# Patient Record
Sex: MALE | Race: WHITE | NOT HISPANIC OR LATINO
[De-identification: names, ages, dates, MRNs, and addresses within clinical notes are randomized per-mention and may not be internally consistent; named-entity substitution may affect disease eponyms.]

---

## 2018-12-29 ENCOUNTER — RECORD ABSTRACTING (OUTPATIENT)
Age: 58
End: 2018-12-29

## 2018-12-29 DIAGNOSIS — R94.6 ABNORMAL RESULTS OF THYROID FUNCTION STUDIES: ICD-10-CM

## 2018-12-29 DIAGNOSIS — E78.5 HYPERLIPIDEMIA, UNSPECIFIED: ICD-10-CM

## 2018-12-29 DIAGNOSIS — Z83.49 FAMILY HISTORY OF OTHER ENDOCRINE, NUTRITIONAL AND METABOLIC DISEASES: ICD-10-CM

## 2018-12-29 DIAGNOSIS — N40.0 BENIGN PROSTATIC HYPERPLASIA WITHOUT LOWER URINARY TRACT SYMPMS: ICD-10-CM

## 2018-12-29 DIAGNOSIS — Z86.39 PERSONAL HISTORY OF OTHER ENDOCRINE, NUTRITIONAL AND METABOLIC DISEASE: ICD-10-CM

## 2018-12-29 DIAGNOSIS — Z78.9 OTHER SPECIFIED HEALTH STATUS: ICD-10-CM

## 2019-02-19 ENCOUNTER — LABORATORY RESULT (OUTPATIENT)
Age: 59
End: 2019-02-19

## 2019-02-22 ENCOUNTER — APPOINTMENT (OUTPATIENT)
Dept: ENDOCRINOLOGY | Facility: CLINIC | Age: 59
End: 2019-02-22
Payer: COMMERCIAL

## 2019-02-22 VITALS
HEIGHT: 68 IN | WEIGHT: 159 LBS | DIASTOLIC BLOOD PRESSURE: 80 MMHG | TEMPERATURE: 72 F | BODY MASS INDEX: 24.1 KG/M2 | SYSTOLIC BLOOD PRESSURE: 110 MMHG

## 2019-02-22 DIAGNOSIS — D64.9 ANEMIA, UNSPECIFIED: ICD-10-CM

## 2019-02-22 PROCEDURE — 99213 OFFICE O/P EST LOW 20 MIN: CPT

## 2019-04-20 NOTE — HISTORY OF PRESENT ILLNESS
[FreeTextEntry1] : February 22, 2019\par \par  PCP: - Dr. Dave Evans\par             ENT: Dr. Arrington - 2011 - cautarized L VC and lasered R\par             GI: Dr. Shen - colonoscopy 2013 - told to return in 10 year\par            .\par            CC: Hypothyroid (Dx - June 2012 - TSH 6.6) TPO ab neg\par             -2012: Thyroid sonogram [facility] - negative\par             (Hct 41)\par .\par Last visit dose increased: levothyroxine 25 mcg daily, but TWO on Sat AND Sun.  \par Last year, ripped L hamstring running.\par Back playing volleyball and dancing.    Also singing - had a few shows.  \par TSH slightly elevated.  \par  \par Plan:  Levothyroxine 25 mcg x 10 tablets a week.\par Updated labs and ROV in 8 months.  \par \par \par \par Previous notes from [employer] appended below.\par \par  January 15, 2018\par            .\par            PCP: - Dr. Viral Leyva phone p [phone] - last 6/2016\par             Fax: [phone]\par             ENT: Dr. Arrington - 2011 - cautarized L VC and lasered R\par             GI: Dr. Shen - colonoscopy 2013 - told to return in 10 year\par            .\par            CC: Hypothyroid (Dx - June 2012 - TSH 6.6) TPO ab neg\par             -2012: Thyroid sonogram [facility] - negative\par             (Hct 41)\par            .\par            Remains on levothyroxine 25 mcg x 8 a week.\par            Recent TSH 5.96\par            .\par            Plan: Increase to 9 tabs a week and\par            ROV 6 months. \par            .\par            ==\par            .\par            Sept 22, 2017\par            .\par            PCP: - Dr. Viral Leyva phone p [phone] - last 6/2016\par             Fax: [phone]\par             ENT: Dr. Arrington - 2011 - cautarized L VC and lasered R\par             GI: Dr. Shen - colonoscopy 2013 - told to return in 10 year\par            .\par            CC: Hypothyroid (Dx - June 2012 - TSH 6.6) TPO ab neg\par             -2012: Thyroid sonogram [facility] - negative\par             (Hct 41)\par            .\par            Taking levothyroxine 25 mcg x 8 pills a week.\par            TSH in good range.\par            Random BS 57 mg/dl\par            .\par            Impression: He feels well.\par            (Had been on as much as 50 mcg LT4 daily, but he preferred to lower dose.)\par            He is concerned about triglycerides...was not fasting. \par            .\par            Plan: Same Rx and ROV 6 months. \par            Fasting labs before that visit.\par            He will also follow up to Dr. Leyva.\par            Thank you. -jh\par            .\par            .\par            ==\par            .\par            Feb 20, 2017\par            .\par            PCP: - Dr. Viral Leyva phone p [phone] - last 6/2016\par             Fax: [phone]\par             ENT: Dr. Arrington - 2011 - cautarized L VC and lasered R\par             GI: Dr. Shen - colonoscopy 2013 - told to return in 10 year\par            .\par            CC: Hypothyroid (Dx - June 2012 - TSH 6.6) TPO ab neg\par             -2012: Thyroid sonogram [facility] - negative\par             (Hct 41)\par            .\par            Remains on generic levothyroxine 25 mcg daily.\par            Dec 30, 2016 labs at [place] include:\par            TSH 5.48\par            fe 114/TIBC 308 Hct 41.1\par            MCV 86\par            .\par            Impression: Hypothyroidism well controlled. \par            .\par            Plan: Continue same Rx and may be ready to raise dose by next visit. \par            .\par            =\par            .\par            November 11, 2015\par            .\par            PCP: - Dr. Viral Leyva phone p [phone] \par             Fax: [phone]\par             ENT: Dr. Arrington - 2011 - cautarized L VC and lasered R\par             GI: Dr. Shen - colonoscopy 2013 - told to return in 10 year\par            .\par            CC: Hypothyroid (Dx - June 2012 - TSH 6.6)\par             -2012: Thyroid sonogram [facility] - negative\par            .\par            Note from April appended below\par            .\par            October 24 labs ([place]) include\par            TSH 3.0 on levothyroxine \par            LDL 89\par            HDL 44\par            BS 69 mg/dl\par            Hct 41.2\par            .\par            Impression: Previous ultrasound thyrid unremarkable at [facility] in 2012. Mild hypothyroidism remains well controlled on generic levothyroxine Appears to have a slight anemia\par            .\par            Plan: Updated CBC today, f/u to  and Dr. Shen. \par            ROV 6-8 months. Call a few days after labs. \par            .\par            .\par            ==\par            .\par            April 2, 2015\par            .\par            PCP: - Dr. Viral Leyva phone p [phone] \par             Fax: [phone]\par             ENT: Dr. Arrington - 2011 - cautarized L VC and lasered R\par            .\par            CC: Hypothyroid (Dx - June 2012 - TSH 6.6)\par            .\par            Teaches. \par            During summer, paints houses.\par            Likes singing and dancing. 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 Jimbo Kramer - Dr. Viral Leyva phone p [phone] \par             Fax: [phone]\par            CC: Hypothyroid (Dx - June 2012 - TSH 6.6)\par            .\par            Most recent lab tests from June 19, 2014 show TSH 3.61. \par            Currently taking PREET Synthroid 25 mcg daily.\par            He is feeling well.\par            Has been painting over the summer and has\par            singing engagement scheduled at [employer].\par            .\par            Impression: Very early hypothyroidism.\par            Plan: Switch to generic levothyroxine.\par            Repeat labs December and ROV January.\par            .\par            .\par            =====\par            Jan 20, 2014\par            PCP: Dr. Jimbo Kramer\par            CC: Hypothyroid\par            .\par            Previous note appended below.\par            Mother is on thyroid hormone.\par            Oriignally started on PREET Synthroid 25 and now takes 50 mcg, but he would like to try going back to the 25 mcg dose.\par            He started on 50 mcg in October and by Dec, Dr. Krmaer found TSH down to normal at 3.06. \par            .\par            Impression: As he prefers the 25 mcg dlose of PREET Synthroid, advised him he could cut pills in half or take every other day. He had thyroid antibodies by Dr. Kramer, but we \par            Re-requested antibody levels.\par            .\par            ========\par            July 30, 2013\par            PCP: Dr. Jimbo Kramer\par            CC: Hypothyroid knows Dr. Noe Barrera\par            .\par            Dr. 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 Also has lots of sinus problems. \par            Non-smoker.\par            .\par            Diagnosed with underactive thyroid June 2012. October started on 25 mcg daily. \par            .\par            TSH went back up when he stopped the medication.\par \par

## 2019-08-28 ENCOUNTER — RX CHANGE (OUTPATIENT)
Age: 59
End: 2019-08-28

## 2019-09-06 ENCOUNTER — RX CHANGE (OUTPATIENT)
Age: 59
End: 2019-09-06

## 2019-10-26 ENCOUNTER — LABORATORY RESULT (OUTPATIENT)
Age: 59
End: 2019-10-26

## 2019-11-11 ENCOUNTER — APPOINTMENT (OUTPATIENT)
Dept: ENDOCRINOLOGY | Facility: CLINIC | Age: 59
End: 2019-11-11
Payer: COMMERCIAL

## 2019-11-11 VITALS
DIASTOLIC BLOOD PRESSURE: 70 MMHG | SYSTOLIC BLOOD PRESSURE: 110 MMHG | BODY MASS INDEX: 24.25 KG/M2 | WEIGHT: 160 LBS | HEIGHT: 68 IN | HEART RATE: 73 BPM

## 2019-11-11 PROCEDURE — 99214 OFFICE O/P EST MOD 30 MIN: CPT

## 2019-11-11 NOTE — HISTORY OF PRESENT ILLNESS
[FreeTextEntry1] : Nov 11, 2019\par \par  PCP: - Dr. Dave Evans\par             ENT: Dr. Arrington - 2011 - cauterized L VC and lasered R\par             GI: Dr. Shen - colonoscopy 2013 - told to return in 10 year\par              ENT  Dev MARIA DEL ROSARIO Tipton MD\par            .\par            CC: Hypothyroid (Dx - June 2012 - TSH 6.6) TPO ab neg\par             -2012: Thyroid sonogram [facility] - negative\par             (Hct 41)\par \par For sore throat and sinus problem treated with Z [place], [place] [facility] [place] with\par benefit.  \par \par Last saw Dr. Arrington about 2016.  Also sees Portillo Tipton MD in [place].\par \par Impression:  room to increase the levothryoxine by one tablet.\par Follow up to ENT.\par [place] [facility] [place] [facility] [place] \par Impression:  Doing well.  Hypothyroidism very slowly progressing.  Ultrasound of thyroid at [facility] in 10/2012\par showed no focal abnormality.  \par .\par Taking levothyroxine 25 mcg daily but two (2) tablets Fri, Sat, Sun.\par TSH is 4.37 (0.27-4.2) so will increase to two (2) tablets on Thurs, Fri, Sat and Sun or\par 11 tablets a week.  Repeat TFTs ~March.     \par \par \par \par February 22, 2019\par \par  PCP: - Dr. Dave Evans\par             ENT: Dr. Arrington - 2011 - cautarized L VC and lasered R\par             GI: Dr. Shen - colonoscopy 2013 - told to return in 10 year\par            .\par            CC: Hypothyroid (Dx - June 2012 - TSH 6.6) TPO ab neg\par             -2012: Thyroid sonogram [facility] - negative\par             (Hct 41)\par .\par Last visit dose increased: levothyroxine 25 mcg daily, but TWO on Sat AND Sun.  \par Last year, ripped L hamstring running.\par Back playing volleyball and dancing.    Also singing - had a few shows.  \par TSH slightly elevated.  \par  \par Plan:  Levothyroxine 25 mcg x 10 tablets a week.\par Updated labs and ROV in 8 months.  \par \par \par \par Previous notes from [employer] appended below.\par \par  January 15, 2018\par            .\par            PCP: - Dr. Viral Leyva phone p [phone] - last 6/2016\par             Fax: [phone]\par             ENT: Dr. Arrington - 2011 - cautarized L VC and lasered R\par             GI: Dr. Shen - colonoscopy 2013 - told to return in 10 year\par            .\par            CC: Hypothyroid (Dx - June 2012 - TSH 6.6) TPO ab neg\par             -2012: Thyroid sonogram [facility] - negative\par             (Hct 41)\par            .\par            Remains on levothyroxine 25 mcg x 8 a week.\par            Recent TSH 5.96\par            .\par            Plan: Increase to 9 tabs a week and\par            ROV 6 months. \par            .\par            ==\par            .\par            Sept 22, 2017\par            .\par            PCP: - Dr. Viral Leyva phone p [phone] - last 6/2016\par             Fax: [phone]\par             ENT: Dr. Arrington - 2011 - cautarized L VC and lasered R\par             GI: Dr. Shen - colonoscopy 2013 - told to return in 10 year\par            .\par            CC: Hypothyroid (Dx - June 2012 - TSH 6.6) TPO ab neg\par             -2012: Thyroid sonogram [facility] - negative\par             (Hct 41)\par            .\par            Taking levothyroxine 25 mcg x 8 pills a week.\par            TSH in good range.\par            Random BS 57 mg/dl\par            .\par            Impression: He feels well.\par            (Had been on as much as 50 mcg LT4 daily, but he preferred to lower dose.)\par            He is concerned about triglycerides...was not fasting. \par            .\par            Plan: Same Rx and ROV 6 months. \par            Fasting labs before that visit.\par            He will also follow up to Dr. Leyva.\par            Thank you. -\par            .\par            .\par            ==\par            .\par            Feb 20, 2017\par            .\par            PCP: - Dr. Viral Leyva phone p [phone] - last 6/2016\par             Fax: [phone]\par             ENT: Dr. Arrington - Mar - cautarized L VC and lasered R\par             GI: Dr. Shen - colonoscopy 2013 - told to return in 10 year\par            .\par            CC: Hypothyroid (Dx - June 2012 - TSH 6.6) TPO ab neg\par             -2012: Thyroid sonogram [facility] - negative\par             (Hct 41)\par            .\par            Remains on generic levothyroxine 25 mcg daily.\par            Dec 30, 2016 labs at [place] include:\par            TSH 5.48\par            fe 114/TIBC 308 Hct 41.1\par            MCV 86\par            .\par            Impression: Hypothyroidism well controlled. \par            .\par            Plan: Continue same Rx and may be ready to raise dose by next visit. \par            .\par            =\par            .\par            November 11, 2015\par            .\par            PCP: - Dr. Viral Leyva phone p [phone] \par             Fax: [phone]\par             ENT: Dr. Arrington - 2011 - cautarized L VC and lasered R\par             GI: Dr. Shen - colonoscopy 2013 - told to return in 10 year\par            .\par            CC: Hypothyroid (Dx - June 2012 - TSH 6.6)\par             -2012: Thyroid sonogram [facility] - negative\par            .\par            Note from April appended below\par            .\par            October 24 labs ([place]) include\par            TSH 3.0 on levothyroxine \par            LDL 89\par            HDL 44\par            BS 69 mg/dl\par            Hct 41.2\par            .\par            Impression: Previous ultrasound thyrid unremarkable at [facility] in 2012. Mild hypothyroidism remains well controlled on generic levothyroxine Appears to have a slight anemia\par            .\par            Plan: Updated CBC today, f/u to  and Dr. Shen. \par            ROV 6-8 months. Call a few days after labs. \par            .\par            .\par            ==\par            .\par            April 2, 2015\par            .\par            PCP: - Dr. Viral Leyva phone p [phone] \par             Fax: [phone]\par             ENT: Dr. Arrington - 2011 - cautarized L VC and lasered R\par            .\par            CC: Hypothyroid (Dx - June 2012 - TSH 6.6)\par            .\par            Teaches. \par            During summer, paints houses.\par            Likes singing and dancing. 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 Jimbo Kramer - Dr. Viral Leyva phone p [phone] \par             Fax: [phone]\par            CC: Hypothyroid (Dx - June 2012 - TSH 6.6)\par            .\par            Most recent lab tests from June 19, 2014 show TSH 3.61. \par            Currently taking PREET Synthroid 25 mcg daily.\par            He is feeling well.\par            Has been painting over the summer and has\par            singing engagement scheduled at [employer].\par            .\par            Impression: Very early hypothyroidism.\par            Plan: Switch to generic levothyroxine.\par            Repeat labs December and ROV January.\par            .\par            .\par            =====\par            Jan 20, 2014\par            PCP: Dr. Jimbo Kramer\par            CC: Hypothyroid\par            .\par            Previous note appended below.\par            Mother is on thyroid hormone.\par            Oriignally started on PREET Synthroid 25 and now takes 50 mcg, but he would like to try going back to the 25 mcg dose.\par            He started on 50 mcg in October and by Dec, Dr. Kramer found TSH down to normal at 3.06. \par            .\par            Impression: As he prefers the 25 mcg dlose of PREET Synthroid, advised him he could cut pills in half or take every other day. He had thyroid antibodies by Dr. Kramer, but we \par            Re-requested antibody levels.\par            .\par            ========\par            July 30, 2013\par            PCP: Dr. Jimbo Kramer\par            CC: Hypothyroid knows Dr. Noe Barrera\par            .\par            Dr. 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 Also has lots of sinus problems. \par            Non-smoker.\par            .\par            Diagnosed with underactive thyroid June 2012. October started on 25 mcg daily. \par            .\par            TSH went back up when he stopped the medication.\par \par

## 2019-11-11 NOTE — PHYSICAL EXAM
[Alert] : alert [No Acute Distress] : no acute distress [Well Nourished] : well nourished [Well Developed] : well developed [Healthy Appearance] : healthy appearance [Normal Voice/Communication] : normal voice communication [No Proptosis] : no proptosis [No Lid Lag] : no lid lag [Normal Outer Ear/Nose] : the ears and nose were normal in appearance [Thyroid Not Enlarged] : the thyroid was not enlarged [No Thyroid Nodules] : there were no palpable thyroid nodules [No Respiratory Distress] : no respiratory distress [Normal Rate and Effort] : normal respiratory rhythm and effort [No Accessory Muscle Use] : no accessory muscle use [Normal Rate] : heart rate was normal  [Normal S1, S2] : normal S1 and S2 [Regular Rhythm] : with a regular rhythm [No Edema] : there was no peripheral edema [No Stigmata of Cushings Syndrome] : no stigmata of cushings syndrome [No Involuntary Movements] : no involuntary movements were seen [No Tremors] : no tremors [Normal Insight/Judgement] : insight and judgment were intact [Oriented x3] : oriented to person, place, and time [Normal Affect] : the affect was normal [Normal Mood] : the mood was normal

## 2020-03-19 ENCOUNTER — LABORATORY RESULT (OUTPATIENT)
Age: 60
End: 2020-03-19

## 2020-03-23 ENCOUNTER — APPOINTMENT (OUTPATIENT)
Dept: ENDOCRINOLOGY | Facility: CLINIC | Age: 60
End: 2020-03-23
Payer: COMMERCIAL

## 2020-03-23 PROCEDURE — 99443: CPT

## 2020-03-23 NOTE — HISTORY OF PRESENT ILLNESS
[Home] : at home, [unfilled] , at the time of the visit. [Clinic: ([facility])___] : at the clinic in [unfilled] [Patient & Provider:  (Enter provider's Role) ____] : The patient, [unfilled] and [unfilled] ~ecp~  participated in the telehealth encounter [FreeTextEntry2] : Patient [FreeTextEntry3] : Patient [FreeTextEntry1] : Mar 23, 2020  CC: Hypothyroid  PCP: Dr. Dave Evans - expects to see in June 2020  This is a Tele-Phonic Billable Encouter follow up encounter initiated by the patient, Mr. Gibson Westfall at 8:55 am.  Rationale: Corona virus. Not seen by a [facility] provider within Endocrinology in the past 7 days. Does not have b/u appointment within 7 days after this encounter.  This is a 21+ minute Tele-Phonic encounter with an established patient which was initiated by the patient during a time scheduled for a visit and the patient is aware that this may be a billable encounter. The patient has not seen a provider of my specialty (Endocrinology) within out group in the past 7 days and this encounter is not anticipated to result in a scheduled in-person visit within he next 7 days. The reason for this Tele-Phonic encounter is listed below under "CC:" Verbal consent was discussed and obtained from the patient for this visit: "You have chosen to receive care through the use of tele-media or telephonee. This enables health care providers at different locations to provide safe, effective, and convenient care through the use of technology. Please note this is a billable encounter. As with any health care service, there are risks associated with the use of tele-media or telephone, including equipment failure, poor image and/or resolutaion, and  issues. You understant that I cannot physically examine you and that you may need to come to the office to complete the assessment.  Patient agreed verbally to understanding the risks, benefits, alternatives of Tele-Media and telephone as explained. All questions regarding tele-media and telephone encounters were answered.   For the hypothyroidism, he is taking: Taking levothyroxine 25 mcg daily but two (2) tablets on Thurs, Fri, Sat and Sun or 11 tablets a week. Repeat TFTs   On March 19, 2020  TSH 5.67  Plan: Increase levothyroxine to 50 mcg daily and repeat TFTs in about six months. 25 minute encounter, CTP 52621   James Hellerman, MD Endocrinology

## 2020-03-23 NOTE — ASSESSMENT
[FreeTextEntry1] : Hypothyroid - will require increase in dose of levothyroxine to 50 mcg daiily.  \par \par 25 minute phone visit

## 2021-03-18 ENCOUNTER — TRANSCRIPTION ENCOUNTER (OUTPATIENT)
Age: 61
End: 2021-03-18

## 2021-03-19 ENCOUNTER — APPOINTMENT (OUTPATIENT)
Dept: ENDOCRINOLOGY | Facility: CLINIC | Age: 61
End: 2021-03-19
Payer: COMMERCIAL

## 2021-03-19 LAB
ALBUMIN SERPL ELPH-MCNC: 4.4 G/DL
ALP BLD-CCNC: 77 U/L
ALT SERPL-CCNC: 26 U/L
ANION GAP SERPL CALC-SCNC: 16 MMOL/L
AST SERPL-CCNC: 24 U/L
BASOPHILS # BLD AUTO: 0.03 K/UL
BASOPHILS NFR BLD AUTO: 0.5 %
BILIRUB DIRECT SERPL-MCNC: 0.1 MG/DL
BILIRUB INDIRECT SERPL-MCNC: 0.2 MG/DL
BILIRUB SERPL-MCNC: 0.3 MG/DL
BUN SERPL-MCNC: 19 MG/DL
CALCIUM SERPL-MCNC: 9.7 MG/DL
CHLORIDE SERPL-SCNC: 105 MMOL/L
CO2 SERPL-SCNC: 20 MMOL/L
CREAT SERPL-MCNC: 1.03 MG/DL
EOSINOPHIL # BLD AUTO: 0.15 K/UL
EOSINOPHIL NFR BLD AUTO: 2.5 %
GLUCOSE SERPL-MCNC: 87 MG/DL
HCT VFR BLD CALC: 43.9 %
HGB BLD-MCNC: 14.4 G/DL
IMM GRANULOCYTES NFR BLD AUTO: 0.3 %
LYMPHOCYTES # BLD AUTO: 1.51 K/UL
LYMPHOCYTES NFR BLD AUTO: 25.2 %
MAN DIFF?: NORMAL
MCHC RBC-ENTMCNC: 29.6 PG
MCHC RBC-ENTMCNC: 32.8 GM/DL
MCV RBC AUTO: 90.1 FL
MONOCYTES # BLD AUTO: 0.64 K/UL
MONOCYTES NFR BLD AUTO: 10.7 %
NEUTROPHILS # BLD AUTO: 3.64 K/UL
NEUTROPHILS NFR BLD AUTO: 60.8 %
PLATELET # BLD AUTO: 197 K/UL
POTASSIUM SERPL-SCNC: 4.7 MMOL/L
PROT SERPL-MCNC: 6.7 G/DL
PSA SERPL-MCNC: 0.88 NG/ML
RBC # BLD: 4.87 M/UL
RBC # FLD: 13.1 %
SODIUM SERPL-SCNC: 141 MMOL/L
T3 SERPL-MCNC: 119 NG/DL
T4 SERPL-MCNC: 8 UG/DL
TSH SERPL-ACNC: 3.9 UIU/ML
WBC # FLD AUTO: 5.99 K/UL

## 2021-03-19 PROCEDURE — 99443: CPT

## 2021-03-26 NOTE — HISTORY OF PRESENT ILLNESS
[Home] : at home, [unfilled] , at the time of the visit. [Medical Office: ([facility])___] : at the medical office located in  [Verbal consent obtained from patient] : the patient, [unfilled] [FreeTextEntry1] : Mar 19, 2021   Telephonic\par \par PCP:  Dr. Dave Evans\par          ENT:  Dr. Arrington,  Dr. Tipton\par          GI: Dr. Shen\par \par \par CC:  Hypothyroid\par               -[facility] 2012 - rassuring\par \par Recent lab tests at [facility] on\par 1/23/2021 included TSH 3.9\par Taking levothoxine 25 mcg tablets, currently 10 tablets a week.  \par \par Impression:  Well controlled hypothhroidism.\par \par Plan;  Same Rx.  \par \par

## 2021-06-01 ENCOUNTER — RESULT REVIEW (OUTPATIENT)
Age: 61
End: 2021-06-01

## 2022-02-24 ENCOUNTER — LABORATORY RESULT (OUTPATIENT)
Age: 62
End: 2022-02-24

## 2022-03-04 ENCOUNTER — APPOINTMENT (OUTPATIENT)
Dept: ENDOCRINOLOGY | Facility: CLINIC | Age: 62
End: 2022-03-04
Payer: COMMERCIAL

## 2022-03-04 VITALS
DIASTOLIC BLOOD PRESSURE: 80 MMHG | HEART RATE: 82 BPM | HEIGHT: 68 IN | WEIGHT: 158 LBS | SYSTOLIC BLOOD PRESSURE: 120 MMHG | BODY MASS INDEX: 23.95 KG/M2 | OXYGEN SATURATION: 98 %

## 2022-03-04 PROCEDURE — 99214 OFFICE O/P EST MOD 30 MIN: CPT

## 2022-03-04 RX ORDER — LEVOTHYROXINE SODIUM 0.05 MG/1
50 TABLET ORAL
Qty: 60 | Refills: 11 | Status: DISCONTINUED | COMMUNITY
Start: 1900-01-01 | End: 2022-03-04

## 2022-03-04 NOTE — HISTORY OF PRESENT ILLNESS
[FreeTextEntry1] : Mar 04, 2022    in person\par \par  PCP: - Dr. Dave Evans\par             ENT: Dr. Arrington - 2011 - cautarized L VC and lasered R\par             GI: Dr. Shen - colonoscopy 2013 - told to return in 10 year\par            .\par            CC: Hypothyroid (Dx - June 2012 - TSH 6.6) TPO ab neg\par             -2012: Thyroid sonogram [facility] - negative    Also 2013 \par             (Hct 41)\par \par Recent lab tests at [place] on\par 1/23/2021 included TSH 3.9\par Taking levothoxine 50 mcg every day \par \par Impression:  TSH is slighlty elevated.\par Plan:  Increase levothyroxine x 7 tabs a week to 7 1/2 tabs a week\par Will also take into account effect on his energy, mood. \par \par \par \par Mar 19, 2021   Telephonic\par \par PCP:  Dr. Dave Evans\par          ENT:  Dr. Arrington,  Dr. Tipton\par          GI: Dr. Shen\par \par \par CC:  Hypothyroid\par               -[facility] 2012 - rassuring\par \par Recent lab tests at [place] on\par 1/23/2021 included TSH 3.9\par Taking levothoxine 25 mcg tablets, currently 10 tablets a week.  \par \par Impression:  Well controlled hypothhroidism.\par \par Plan;  Same Rx.  \par \par

## 2023-01-23 ENCOUNTER — RESULT REVIEW (OUTPATIENT)
Age: 63
End: 2023-01-23

## 2023-02-15 ENCOUNTER — APPOINTMENT (OUTPATIENT)
Dept: ENDOCRINOLOGY | Facility: CLINIC | Age: 63
End: 2023-02-15
Payer: COMMERCIAL

## 2023-02-15 VITALS
OXYGEN SATURATION: 99 % | HEART RATE: 82 BPM | SYSTOLIC BLOOD PRESSURE: 118 MMHG | HEIGHT: 68 IN | DIASTOLIC BLOOD PRESSURE: 73 MMHG | BODY MASS INDEX: 24.25 KG/M2 | WEIGHT: 160 LBS

## 2023-02-15 DIAGNOSIS — E53.8 DEFICIENCY OF OTHER SPECIFIED B GROUP VITAMINS: ICD-10-CM

## 2023-02-15 DIAGNOSIS — Z00.00 ENCOUNTER FOR GENERAL ADULT MEDICAL EXAMINATION W/OUT ABNORMAL FINDINGS: ICD-10-CM

## 2023-02-15 PROCEDURE — 99214 OFFICE O/P EST MOD 30 MIN: CPT

## 2023-02-15 NOTE — HISTORY OF PRESENT ILLNESS
[FreeTextEntry1] : Feb 15, 2023    in person\par \par PCP: - Dr. Dave Evans\par             ENT: Dr. Arrington - 2011 - cautarized L VC and lasered R\par             GI: Dr. Shen - colonoscopy 2013 - > Dr. Sharma     12/9/2022  \par            .\par            CC: Hypothyroid (Dx - June 2012 - TSH 6.6) TPO ab neg\par             -2012: Thyroid [facility] - negative    Also 2013 \par             (Hct 41)\par \par 61 yo with hypothhyroidism Dx June 2012.  Taking levothyoxine 50 mcg x 7 1/2 tabs a week.    \par Recent TSH 5.66\par \par Also attended (February) [employer] dinner.  \par \par : :\par Constitutional:  Alert, well nourished, healthy appearance, no acute distress \par Eyes:  No proptosis, no stare\par Thyroid:\par Pulmonary:  No respiratory distress, no accessory muscle use; normal rate and effort\par Cardiac:  Normal rate\par Vascular: \par Endocrine:  No stigmata of Cushing’s Syndrome\par Musculoskeletal:  Normal gait, no involuntary movements\par Neurology:  No tremors\par Affect/Mood/Psych:  Oriented x 3; normal affect, normal insight/judgement, normal mood \par .\par \par Plan:  Increase the levothyroxien 50 mcg x 7 1/2 tabs a week to 8 tabs a week and repeat in six months.\par \par \par \par Mar 04, 2022    in person\par \par  PCP: - Dr. Dave Evans\par             ENT: Dr. Arrington - 2011 - cautarized L VC and lasered R\par             GI: Dr. Shen - colonoscopy 2013 - told to return in 10 year\par            .\par            CC: Hypothyroid (Dx - June 2012 - TSH 6.6) TPO ab neg\par             -2012: Thyroid [facility] - negative    Also 2013 \par             (Hct 41)\par \par Recent lab tests at [place] on\par 1/23/2021 included TSH 3.9\par Taking levothoxine 50 mcg every day \par \par Impression:  TSH is slighlty elevated.\par Plan:  Increase levothyroxine x 7 tabs a week to 7 1/2 tabs a week\par Will also take into account effect on his energy, mood. \par \par \par \par Mar 19, 2021   Telephonic\par \par PCP:  Dr. Dave Evans\par          ENT:  Dr. Arrington,  Dr. Tipton\par          GI: Dr. Shen\par \par \par CC:  Hypothyroid\par               -[facility] 2012 - [facility]uring\par \par Recent lab tests at [place] on\par 1/23/2021 included TSH 3.9\par Taking levothoxine 25 mcg tablets, currently 10 tablets a week.  \par \par Impression:  Well controlled hypothhroidism.\par \par Plan;  Same Rx.  \par \par

## 2023-08-27 ENCOUNTER — RESULT REVIEW (OUTPATIENT)
Age: 63
End: 2023-08-27

## 2023-12-06 ENCOUNTER — APPOINTMENT (OUTPATIENT)
Dept: ENDOCRINOLOGY | Facility: CLINIC | Age: 63
End: 2023-12-06
Payer: COMMERCIAL

## 2023-12-06 VITALS
HEIGHT: 68 IN | WEIGHT: 160.13 LBS | SYSTOLIC BLOOD PRESSURE: 116 MMHG | HEART RATE: 85 BPM | DIASTOLIC BLOOD PRESSURE: 80 MMHG | OXYGEN SATURATION: 97 % | BODY MASS INDEX: 24.27 KG/M2

## 2023-12-06 DIAGNOSIS — R53.83 OTHER FATIGUE: ICD-10-CM

## 2023-12-06 DIAGNOSIS — E03.9 HYPOTHYROIDISM, UNSPECIFIED: ICD-10-CM

## 2023-12-06 PROCEDURE — 36415 COLL VENOUS BLD VENIPUNCTURE: CPT

## 2023-12-06 PROCEDURE — 99214 OFFICE O/P EST MOD 30 MIN: CPT | Mod: 25

## 2023-12-06 RX ORDER — LEVOTHYROXINE SODIUM 0.05 MG/1
50 TABLET ORAL
Qty: 35 | Refills: 11 | Status: ACTIVE | COMMUNITY
Start: 2022-03-04 | End: 1900-01-01

## 2023-12-07 LAB
ALBUMIN SERPL ELPH-MCNC: 4.4 G/DL
ALP BLD-CCNC: 87 U/L
ALT SERPL-CCNC: 29 U/L
ANION GAP SERPL CALC-SCNC: 13 MMOL/L
AST SERPL-CCNC: 28 U/L
BILIRUB DIRECT SERPL-MCNC: 0.1 MG/DL
BILIRUB INDIRECT SERPL-MCNC: 0.2 MG/DL
BILIRUB SERPL-MCNC: 0.2 MG/DL
BUN SERPL-MCNC: 18 MG/DL
CALCIUM SERPL-MCNC: 9.6 MG/DL
CHLORIDE SERPL-SCNC: 104 MMOL/L
CO2 SERPL-SCNC: 25 MMOL/L
CORTIS SERPL-MCNC: 9.6 UG/DL
CREAT SERPL-MCNC: 0.9 MG/DL
EGFR: 97 ML/MIN/1.73M2
FERRITIN SERPL-MCNC: 174 NG/ML
GLUCOSE SERPL-MCNC: 97 MG/DL
HCT VFR BLD CALC: 42.6 %
HGB BLD-MCNC: 14.3 G/DL
IRON SATN MFR SERPL: 21 %
IRON SERPL-MCNC: 68 UG/DL
LH SERPL-ACNC: 4.7 IU/L
MCHC RBC-ENTMCNC: 30.2 PG
MCHC RBC-ENTMCNC: 33.6 GM/DL
MCV RBC AUTO: 89.9 FL
PLATELET # BLD AUTO: 186 K/UL
POTASSIUM SERPL-SCNC: 4.5 MMOL/L
PROLACTIN SERPL-MCNC: 8.6 NG/ML
PROT SERPL-MCNC: 6.8 G/DL
RBC # BLD: 4.74 M/UL
RBC # BLD: 4.74 M/UL
RBC # FLD: 13.2 %
RETICS # AUTO: 1.1 %
RETICS AGGREG/RBC NFR: 50.2 K/UL
SODIUM SERPL-SCNC: 142 MMOL/L
T4 SERPL-MCNC: 7.8 UG/DL
TESTOST SERPL-MCNC: 616 NG/DL
TIBC SERPL-MCNC: 324 UG/DL
TSH SERPL-ACNC: 4.77 UIU/ML
UIBC SERPL-MCNC: 255 UG/DL
VIT B12 SERPL-MCNC: 775 PG/ML
WBC # FLD AUTO: 5.89 K/UL

## 2024-01-23 ENCOUNTER — TRANSCRIPTION ENCOUNTER (OUTPATIENT)
Age: 64
End: 2024-01-23

## 2024-08-24 ENCOUNTER — RESULT REVIEW (OUTPATIENT)
Age: 64
End: 2024-08-24

## 2024-09-03 ENCOUNTER — APPOINTMENT (OUTPATIENT)
Dept: ENDOCRINOLOGY | Facility: CLINIC | Age: 64
End: 2024-09-03
Payer: COMMERCIAL

## 2024-09-03 VITALS
DIASTOLIC BLOOD PRESSURE: 68 MMHG | SYSTOLIC BLOOD PRESSURE: 116 MMHG | OXYGEN SATURATION: 98 % | WEIGHT: 155 LBS | BODY MASS INDEX: 23.49 KG/M2 | HEART RATE: 64 BPM | HEIGHT: 68 IN

## 2024-09-03 DIAGNOSIS — E03.9 HYPOTHYROIDISM, UNSPECIFIED: ICD-10-CM

## 2024-09-03 PROCEDURE — 99215 OFFICE O/P EST HI 40 MIN: CPT

## 2024-09-03 NOTE — HISTORY OF PRESENT ILLNESS
[FreeTextEntry1] : Sep 03, 2024       in person    - his 91 yo mother  after a fall and broken hip  PCP: - Dr. Dave Evans             ENT: Dr. Arrington -  - cautarized L VC and lasered R             GI: Dr. Shen - colonoscopy  - > Dr. Sharma     2022              .            CC: Hypothyroid (Dx - 2012 - TSH 6.6) TPO ab neg             -: Thyroid [facility] - negative    Also 2013              (Hct 41)  65 yo with hypothhyroidism Dx 2012.  Taking levothyoxine 50 mcg  tabs x 8 tabs a week  Lab tests from  included WBC 6.92 Hct 43.5 glucose 66 LDL  77 - on no Rx ! HDL 51  TSH  3.71 on LT4 50 mcg x 8 tabs a week.    PSA 1.08   : : Constitutional:  Alert, well nourished, healthy appearance, no acute distress  Eyes:  No proptosis, no stare Thyroid:  minimal tissue Pulmonary:  No respiratory distress, no accessory muscle use; normal rate and effort Cardiac:  Normal rate Vascular:  Endocrine:  No stigmata of Cushings Syndrome Musculoskeletal:  Normal gait, no involuntary movements Neurology:  No tremors Affect/Mood/Psych:  Oriented x 3; normal affect, normal insight/judgement, normal mood   Impression:  Hypothyroidism well controlled. Plan:  Same Rx with levothyroxine 50 mcg x 8 tabs a week and ROV in under a year. It is possible that he will move to be closer to his siter.     .   Dec 06, 2023    in person     PCP: - Dr. Dave Evans             ENT: Dr. Arrington -  - cautarized L VC and lasered R             GI: Dr. Shen - colonoscopy  - > Dr. Sharma     2022              .            CC: Hypothyroid (Dx - 2012 - TSH 6.6) TPO ab neg             -: Thyroid [facility] - negative    Also               (Hct 41)  64 yo with hypothhyroidism Dx 2012.  Taking levothyoxine 50 mcg tabs a week.     Recent TSH 5.66  Regularly attends  [employer] dinners.    : : Constitutional:  Alert, well nourished, healthy appearance, no acute distress  Eyes:  No proptosis, no stare Thyroid: Pulmonary:  No respiratory distress, no accessory muscle use; normal rate and effort Cardiac:  Normal rate Vascular:  Endocrine:  No stigmata of Cushings Syndrome Musculoskeletal:  Normal gait, no involuntary movements Neurology:  No tremors Affect/Mood/Psych:  Oriented x 3; normal affect, normal insight/judgement, normal mood  . Impression:  TFTs have been in good range on Lt4 50 mcg x 8 tabs w week. Hct decreased. Had recent colonoscopy. Saw his ophthalmoloigst   Impression   He feels well. TFTs in good range. ? trahjectory of Hct    Plan:  Same LT4 CBC today.     Feb 15, 2023    in person  PCP: - Dr. Dave Evans             ENT: Dr. Arrington -  - cautarized L VC and lasered R             GI: Dr. Shen - colonoscopy  - > Dr. Sharma     2022              .            CC: Hypothyroid (Dx - 2012 - TSH 6.6) TPO ab neg             -: [facility] - negative    Also               (Hct 41)  63 yo with hypothhyroidism Dx 2012.  Taking levothyoxine 50 mcg x 7 1/2 tabs a week.     Recent TSH 5.66  Also attended (February) [employer] dinner.    : : Constitutional:  Alert, well nourished, healthy appearance, no acute distress  Eyes:  No proptosis, no stare Thyroid: Pulmonary:  No respiratory distress, no accessory muscle use; normal rate and effort Cardiac:  Normal rate Vascular:  Endocrine:  No stigmata of Cushing's Syndrome Musculoskeletal:  Normal gait, no involuntary movements Neurology:  No tremors Affect/Mood/Psych:  Oriented x 3; normal affect, normal insight/judgement, normal mood  .  Plan:  Increase the levothyroxien 50 mcg x 7 1/2 tabs a week to 8 tabs a week and repeat in six months.    Mar 04, 2022    in person   PCP: - Dr. Dave Evans             ENT: Dr. Arrington -  - cautarized L VC and lasered R             GI: Dr. Shen - colonoscopy  - told to return in 10 year            .            CC: Hypothyroid (Dx - 2012 - TSH 6.6) TPO ab neg             -: Thyroid [facility] - negative    Also               (Hct 41)  Recent lab tests at [place] on 2021 included TSH 3.9 Taking levothoxine 50 mcg every day   Impression:  TSH is slighlty elevated. Plan:  Increase levothyroxine x 7 tabs a week to 7 1/2 tabs a week Will also take into account effect on his energy, mood.     Mar 19, 2021   Telephonic  PCP:  Dr. Dave Evans          ENT:  Dr. Arrington,  Dr. Tipton          GI: Dr. Shen   CC:  Hypothyroid               -[facility] 2012 - rassuring  Recent lab tests at [place] on 2021 included TSH 3.9 Taking levothoxine 25 mcg tablets, currently 10 tablets a week.    Impression:  Well controlled hypothhroidism.  Plan;  Same Rx.

## 2024-12-11 ENCOUNTER — RX RENEWAL (OUTPATIENT)
Age: 64
End: 2024-12-11

## 2025-08-26 ENCOUNTER — APPOINTMENT (OUTPATIENT)
Dept: ENDOCRINOLOGY | Facility: CLINIC | Age: 65
End: 2025-08-26
Payer: MEDICAID

## 2025-08-26 VITALS
BODY MASS INDEX: 24.25 KG/M2 | WEIGHT: 160 LBS | SYSTOLIC BLOOD PRESSURE: 102 MMHG | HEART RATE: 81 BPM | HEIGHT: 68 IN | DIASTOLIC BLOOD PRESSURE: 80 MMHG | OXYGEN SATURATION: 98 %

## 2025-08-26 DIAGNOSIS — E03.9 HYPOTHYROIDISM, UNSPECIFIED: ICD-10-CM

## 2025-08-26 PROCEDURE — 99215 OFFICE O/P EST HI 40 MIN: CPT
